# Patient Record
Sex: MALE | Race: WHITE
[De-identification: names, ages, dates, MRNs, and addresses within clinical notes are randomized per-mention and may not be internally consistent; named-entity substitution may affect disease eponyms.]

---

## 2017-08-11 ENCOUNTER — HOSPITAL ENCOUNTER (OUTPATIENT)
Dept: HOSPITAL 53 - M LAB REF | Age: 27
End: 2017-08-11
Attending: NURSE PRACTITIONER
Payer: COMMERCIAL

## 2017-08-11 ENCOUNTER — HOSPITAL ENCOUNTER (OUTPATIENT)
Dept: HOSPITAL 53 - M WUC | Age: 27
End: 2017-08-11
Attending: NURSE PRACTITIONER
Payer: COMMERCIAL

## 2017-08-11 DIAGNOSIS — R07.9: Primary | ICD-10-CM

## 2017-08-11 DIAGNOSIS — R07.89: Primary | ICD-10-CM

## 2017-08-11 LAB — AMYLASE SERPL-CCNC: 60 U/L (ref 25–115)

## 2017-08-11 NOTE — REP
Chest two views

 

HISTORY: Chest pain

 

Comparison: 07/30/2012

 

The lungs are clear.  The heart is normal in size.  The pulmonary vasculature is

normal in appearance.  The bony structure is intact.

 

IMPRESSION:  No acute disease.

 

 

Signed by

Carlitos Velasquez MD 08/11/2017 02:09 P

## 2017-08-14 LAB — CONTROL LINE HPYORI: (no result)

## 2018-04-02 ENCOUNTER — HOSPITAL ENCOUNTER (OUTPATIENT)
Dept: HOSPITAL 53 - M LAB REF | Age: 28
End: 2018-04-02
Attending: NURSE PRACTITIONER
Payer: COMMERCIAL

## 2018-04-02 DIAGNOSIS — A69.20: Primary | ICD-10-CM

## 2018-04-02 DIAGNOSIS — K21.9: ICD-10-CM

## 2018-04-02 PROCEDURE — 86677 HELICOBACTER PYLORI ANTIBODY: CPT

## 2018-04-03 LAB
CONTROL LINE HPYORI: (no result)
H PYLORI QUALITATIVE IGG: NEGATIVE

## 2018-04-05 LAB
B BURGDOR IGG+IGM SER-ACNC: <0.91 ISR (ref 0–0.9)
B BURGDOR IGM SER IA-ACNC: <0.8 INDEX (ref 0–0.79)

## 2021-03-25 ENCOUNTER — HOSPITAL ENCOUNTER (OUTPATIENT)
Dept: HOSPITAL 53 - M LAB REF | Age: 31
End: 2021-03-25
Attending: PHYSICIAN ASSISTANT
Payer: COMMERCIAL

## 2021-03-25 DIAGNOSIS — J02.9: Primary | ICD-10-CM

## 2021-09-09 ENCOUNTER — HOSPITAL ENCOUNTER (OUTPATIENT)
Dept: HOSPITAL 53 - M RAD | Age: 31
End: 2021-09-09
Attending: FAMILY MEDICINE
Payer: COMMERCIAL

## 2021-09-09 DIAGNOSIS — M51.26: ICD-10-CM

## 2021-09-09 DIAGNOSIS — M51.27: ICD-10-CM

## 2021-09-09 DIAGNOSIS — G83.4: Primary | ICD-10-CM

## 2021-09-09 PROCEDURE — 72158 MRI LUMBAR SPINE W/O & W/DYE: CPT

## 2021-09-09 NOTE — REPVR
PROCEDURE INFORMATION: 

Exam: MR Lumbar Spine Without and With Contrast 

Exam date and time: 9/9/2021 11:02 AM 

Age: 31 years old 

Clinical indication: Low back pain; Additional info: Cauda equina syndrome 



TECHNIQUE: 

Imaging protocol: Multiplanar magnetic resonance images of the lumbar spine 

without and with intravenous contrast. 

Contrast material: PROHANCE; Contrast volume: 20 ml; Contrast route: 

INTRAVENOUS (IV);  



COMPARISON: 

No relevant prior studies available. 



FINDINGS: 

Vertebrae:  There is straightening of the normal lumbar lordosis.  There is 5 

mm of grade 1 retrolisthesis of L5 with respect to S1.  Normal vertebral body 

alignment is otherwise preserved.  Vertebral body heights are within normal 

limits.  Aside from a hemangioma at L5, marrow signal is within normal limits.

Spinal cord:  The conus medullaris terminates at L1.  The patient has a 

congenitally narrowed spinal canal.

L1-L2:  No significant disc disease.  There is mild facet hypertrophy.  The 

neural foramina are patent.

L2-L3:  There is shallow disc bulging.  There is mild facet hypertrophy.  The 

spinal canal and neural foramina are patent.  

L3-L4:  There is shallow disc bulging.  There is mild facet and ligamentous 

hypertrophy.  There is mild canal stenosis.  There is mild bilateral neural 

foraminal narrowing.  

L4-L5:  There is shallow disc bulging.  There is moderate facet hypertrophy.  

There is mild canal stenosis.  There is mild bilateral neural foraminal 

narrowing.

L5-S1:  There is diffuse disc bulging/uncovering related to listhesis within 

annular tear.  There is moderate facet hypertrophy.  There is mild canal 

stenosis.

Soft tissues: Unremarkable. 



IMPRESSION: 

Degenerative disc disease and spondylosis.  Changes contribute to multilevel 

mild acquired canal stenosis and mild neural foraminal narrowing.



Electronically signed by: Ricarda Luna On 09/09/2021  12:09:50 PM